# Patient Record
Sex: FEMALE | Race: WHITE | NOT HISPANIC OR LATINO | Employment: OTHER | URBAN - METROPOLITAN AREA
[De-identification: names, ages, dates, MRNs, and addresses within clinical notes are randomized per-mention and may not be internally consistent; named-entity substitution may affect disease eponyms.]

---

## 2017-01-16 ENCOUNTER — HOSPITAL ENCOUNTER (OUTPATIENT)
Dept: RADIOLOGY | Facility: HOSPITAL | Age: 57
Discharge: HOME | End: 2017-01-16
Attending: OTOLARYNGOLOGY
Payer: MEDICARE

## 2017-01-16 ENCOUNTER — CLINICAL SUPPORT (OUTPATIENT)
Dept: AUDIOLOGY | Facility: CLINIC | Age: 57
End: 2017-01-16
Payer: MEDICARE

## 2017-01-16 ENCOUNTER — OFFICE VISIT (OUTPATIENT)
Dept: OTOLARYNGOLOGY | Facility: CLINIC | Age: 57
End: 2017-01-16
Payer: MEDICARE

## 2017-01-16 VITALS
SYSTOLIC BLOOD PRESSURE: 127 MMHG | BODY MASS INDEX: 39.65 KG/M2 | DIASTOLIC BLOOD PRESSURE: 89 MMHG | WEIGHT: 203 LBS | TEMPERATURE: 98 F | HEART RATE: 81 BPM

## 2017-01-16 DIAGNOSIS — J32.9 CHRONIC SINUSITIS, UNSPECIFIED LOCATION: ICD-10-CM

## 2017-01-16 DIAGNOSIS — H90.3 SENSORY HEARING LOSS, BILATERAL: Primary | ICD-10-CM

## 2017-01-16 DIAGNOSIS — J30.89 PERENNIAL ALLERGIC RHINITIS, UNSPECIFIED ALLERGIC RHINITIS TRIGGER: ICD-10-CM

## 2017-01-16 DIAGNOSIS — J30.89 PERENNIAL ALLERGIC RHINITIS, UNSPECIFIED ALLERGIC RHINITIS TRIGGER: Primary | ICD-10-CM

## 2017-01-16 PROCEDURE — 70220 X-RAY EXAM OF SINUSES: CPT | Mod: 26,,, | Performed by: RADIOLOGY

## 2017-01-16 PROCEDURE — 70220 X-RAY EXAM OF SINUSES: CPT | Mod: TC,PO

## 2017-01-16 PROCEDURE — 31231 NASAL ENDOSCOPY DX: CPT | Mod: S$PBB,,, | Performed by: OTOLARYNGOLOGY

## 2017-01-16 PROCEDURE — 99203 OFFICE O/P NEW LOW 30 MIN: CPT | Mod: 25,S$PBB,, | Performed by: OTOLARYNGOLOGY

## 2017-01-16 PROCEDURE — 99999 PR PBB SHADOW E&M-EST. PATIENT-LVL III: CPT | Mod: PBBFAC,,, | Performed by: OTOLARYNGOLOGY

## 2017-01-16 NOTE — PROGRESS NOTES
Referring Provider:    Self Referral  No address on file  Subjective:   Patient: Shannen Manning [id number], :1960   Visit date:2017 2:29 PM    Chief Complaint:  Ear Fullness (review audio today ) and Sinus Problem (currently on zithromax 250 )    HPI:  Shannen is a 56 y.o. female who I was asked to see in consultation for evaluation of the following issue(s):     Chronic nasal drainage.  Numerous rounds of abx.  Uses singulair and nasal steroid sprays with good compliance.  Left ear pain and pressure.  Hx of TMJ surgery.  No prior allergy testing or imaging of the sinuses.  She has a history of nasal surgery.    Review of Systems:  Negative unless checked off.  Gen:  []fever   []fatigue  HENT:  []nosebleeds  []dental problem   Eyes:  []photophobia  []visual disturbance  Resp:  []chest tightness []wheezing  Card:  []chest pain  []leg swelling  GI:  []abdominal pain []blood in stool  :  []dysuria  []hematuria  Musc:  []joint swelling  []gait problem  Skin:  []color change  []pallor  Neuro:  []seizures  []numbness  Hem:  []bruise/bleed easily  Psych:  []hallucinations  []behavioral problems  Allergy/Imm: is allergic to demerol [meperidine].    Her meds, allergies, medical, surgical, social & family histories were reviewed & updated:  -     She has a current medication list which includes the following prescription(s): acyclovir, albuterol, albuterol, amlodipine, aspirin, calcium citrate-vitamin d3 315-200 mg, cetirizine, cholecalciferol (vitamin d3), clopidogrel, cyanocobalamin, docusate sodium, estradiol, estradiol, fluticasone, fluticasone-salmeterol 250-50 mcg/dose, gabapentin, ibuprofen, metformin, metoprolol succinate, montelukast, multivitamin, omeprazole, pravastatin, tamsulosin, tiotropium, tizanidine, and zinc sulfate.  -     She  has a past medical history of COPD (chronic obstructive pulmonary disease); Hypertension; and IVON on CPAP.   -     She  does not have any pertinent problems on file.    -     She  has a past surgical history that includes Hysterectomy; tonsilectomy; Nose surgery; and Temporomandibular joint surgery.  -     She  reports that she has quit smoking. Her smoking use included Cigarettes. She smoked 2.00 packs per day. She does not have any smokeless tobacco history on file. She reports that she does not drink alcohol or use illicit drugs.  -     Her family history includes Asthma in her father; Cancer in her maternal grandfather and maternal grandmother; Diabetes in her mother; Emphysema in her father; Hypertension in her mother.  -     She is allergic to demerol [meperidine].    Objective:     Physical Exam:  Vitals:    Visit Vitals    /89    Pulse 81    Temp 98.1 °F (36.7 °C) (Tympanic)    Wt 92.1 kg (203 lb)    BMI 39.65 kg/m2     General appearance:  Well developed, well nourished    Eyes:  Extraocular motions intact, PERRL    Communication:  no hoarseness, no dysphonia    Ears:  Otoscopy of external auditory canals and tympanic membranes was normal, clinical speech reception thresholds grossly intact, no mass/lesion of auricle.  Nose:  Moderate erythema of the nasal cavities. No significant drainage.  Mouth:  No mass/lesion of lips, teeth, gums, hard/soft palate, tongue, tonsils, or oropharynx.  Pain to palpation of left TMJ.      Cardiovascular:  No pedal edema; Radial Pulses +2     Neck & Lymphatics:  No cervical lymphadenopathy, no neck mass/crepitus/ asymmetry, trachea is midline, no thyroid enlargement/tenderness/mass.    Psych: Oriented x3,  Alert with normal mood and affect.     Respiration/Chest:  Symmetric expansion during respiration, normal respiratory effort.    Skin:  Warm and intact. No ulcerations of face, scalp, neck.      Assessment & Plan:   Shannen was seen today for ear fullness and sinus problem.    Diagnoses and all orders for this visit:    Perennial allergic rhinitis, unspecified allergic rhinitis trigger  -     Aspergillus fumagatus IgE;  Future  -     [place]erm[place] grass IgE; Future  -     Cat epithelium IgE; Future  -     Cladosporium IgE; Future  -     Cockroach, American IgE; Future  -     [place], bald IgE; Future  -     D. farinae IgE; Future  -     D. pteronyssinus IgE; Future  -     Dog dander IgE; Future  -     Plantain, English IgE; Future  -     Jay grass IgE; Future  -     Marsh elder, rough IgE; Future  -     Mugwort IgE; Future  -     Nettle IgE; Future  -     Oak, white IgE; Future  -     Penicillium IgE; Future  -     Ragweed, short, common IgE; Future  -     Ezequiel IgE; Future  -     Allergen, Cocklebur; Future  -     Allergen, Elm Cedar; Future  -     Allergen, Meadow Grass ([place]); Future  -     Allergen, Mucor Racemosus; Future  -     Allergen, Pecan [place] IgE; Future  -     Allergen, White [place]; Future  -     Allergen-Alternaria Alternata; Future  -     Allergen-Cedar; Future  -     Allergen-Common Pigweed; Future  -     Allergen-Silver Birch; Future  -     Feather Panel #2; Future  -     RAST Allergen for [place]; Future  -     RAST Allergen Maple ([place]); Future  -     RAST Allergen [place]; Future  -     RAST Allergen, [place]arters; Future  -     RAST Allergen, Sheep [place]); Future  -     X-Ray Sinuses Min 3 Views; Future    Chronic sinusitis, unspecified location  -     Aspergillus fumagatus IgE; Future  -     [place] grass IgE; Future  -     Cat epithelium IgE; Future  -     Cladosporium IgE; Future  -     Cockroach, American IgE; Future  -     [place], bald IgE; Future  -     D. farinae IgE; Future  -     D. pteronyssinus IgE; Future  -     Dog dander IgE; Future  -     Plantain, English IgE; Future  -     Jay grass IgE; Future  -     Marsh elder, rough IgE; Future  -     Mugwort IgE; Future  -     Nettle IgE; Future  -     Oak, white IgE; Future  -     Penicillium IgE; Future  -     Ragweed, short, common IgE; Future  -     Ezequiel IgE; Future  -     Allergen, Cocklebur; Future  -      Allergen, Elm Cedar; Future  -     Allergen, Meadow Grass ([place]); Future  -     Allergen, Mucor Racemosus; Future  -     Allergen, Pecan [place] IgE; Future  -     Allergen, White [place]; Future  -     Allergen-Alternaria Alternata; Future  -     Allergen-Cedar; Future  -     Allergen-Common Pigweed; Future  -     Allergen-Silver Birch; Future  -     Feather Panel #2; Future  -     RAST Allergen for Eastern [place]; Future  -     RAST Allergen Maple ([place]); Future  -     RAST Allergen [place]; Future  -     RAST Allergen, Lamb's Quarters; Future  -     RAST Allergen, Sheep [place] Dock); Future  -     X-Ray Sinuses Min 3 Views; Future      -SINUSITIS/RHINITIS  Shannen presents today for initial evaluation.  They have multiple sinonasal complaints and determination of the underlying etiology is a problem of moderate to high complexity.  Patients may present with sinonasal symptoms such as nasal obstruction as a primary anatomic disorder.  Patients may also present with recurrent or chronic inflammatory sinonasal symptoms.  Generally, patients can be stratified into one of several groups.      The first group represents patients who have frequent or recurrent upper respiratory infections, most frequently viral.  These patients most frequently have normally functioning immune system's but have high levels of exposure.  This is commonly seen in nurses and schoolteachers as well as other groups who spend large amounts of time around 6 patient's and children.      Other patients may have isolated rhinitis without evidence of sinusitis.  The majority of these patients have ALLERGIC rhinitis however other groups may have more rare forms of rhinitis such as nonallergic rhinitis with eosinophilia syndrome.  As a screening evaluation, if the patient has had a normal endoscopy, from time to time a simple x-ray of the sinuses may be performed in combination with antigen specific ALLERGY testing.    The  third group of patients present with significant evidence of chronic sinusitis.  Nasal endoscopy may reveal polyps or purulent drainage.  CT scan is an important aspect to determining the extent of disease.  Some patients with chronic sinusitis have an underlying etiology of ALLERGY leading to obstruction of the ostiomeatal units with furthering of the infection.  Others may have an acute infection that leads to stenosis of the sinonasal openings followed by a long, progressive course of infection.  Patients with unilateral disease who do not have inverting papilloma typically fall into this latter group.    CT scan of the sinuses has not been performed.      Antigen specific allergy testing  has not been performed.    Allergy treatment with topical steroids and/or antihistamines has been used with good compliance with symptoms unchanged.      By review of all data, history and exam, there does not seem to be a clear distinction between allergic rhinitis versus rhinitis with a component of chronic sinusitis.        My recommendation is antigen specific allergy testing, imaging of sinuses.      EAR pain  -     Temporomandibular joint disorder (TMJ) - Shannen has temporomandibular joint disorder (TMJ).  We recommend and discussed with her conservative measures such as taking anti-inflammatory pain medications (Motrin, Advil, Tylenol), eating a diet of soft foods, applying warm compresses to the area, or gentle muscle stretching and relaxation exercises may help. It is important to avoid chewing gum or eating hard foods. Most cases of TMJ are temporary (usually <2 weeks); thus, treatment is usually conservative.  However, for persisting chronic TMJ, we recommend seeing an oral specialist who may fit you with /splint.      We discussed her medical conditions, treatments and plan.  Shannen should return to clinic if any issues arise (symptoms worsen or persist), otherwise we will see her back in the clinic  only as needed.      Thank you for allowing me to participate in the care of Shannen.    Alex Torres MD        Patient: Shannen Manning [id number], :1960  Procedure date:2017  Patient's medications, allergies, past medical, surgical, social and family histories were reviewed and updated as appropriate.  Chief Complaint:  Ear Fullness (review audio today ) and Sinus Problem (currently on zithromax 250 )    HPI:  Shannen is a 56 y.o. female with the history of present illness as discussed in the clinic note from today.    Procedure: Risks, benefits, and alternatives of the procedure were discussed with the patient, and the patient consented to the nasal endoscopy.  The nasal cavity was sprayed with a topical decongestant and anesthetic (if needed). The endoscope was passed into each nostril and each nasal cavity was visualized.  On each side the nasal cavity, sinuses (if open), turbinates, and septum were examined with the findings described below. At the end of the examination, the scope was removed. The patient tolerated the procedure well with no complications.       Endoscopic Sinonasal Exam Findings:  -     The right side has moderate edema  -     The left side has moderate edema  -     Nasal secretions: No discolored secretions noted bilaterally  -     Nasal septum: no significant deviation or perforation appreciated   -     Inferior turbinate: Normal mucosa without significant hypertrophy bilaterally  -     Middle turbinate: Normal mucosa without significant hypertrophy bilaterally  -     Other findings: No mass or obstructive lesion    Assessment & Plan:  - see today's clinic note

## 2017-01-16 NOTE — MR AVS SNAPSHOT
[facility] - ENT  [address] [facility] [place] [address]  Phone: [phone]  Fax: [phone]                  Shannen Manning   2017 1:30 PM   Office Visit    Description:  Female : 1960   Provider:  Alex Torres MD   Department:  [facility] - ENT           Reason for Visit     Ear Fullness     Sinus Problem           Diagnoses this Visit        Comments    Perennial allergic rhinitis, unspecified allergic rhinitis trigger    -  Primary     Chronic sinusitis, unspecified location                To Do List           Future Appointments        Provider Department Dept Phone    2017 2:15 PM [facility] XR2 [facility] [phone]    2017 4:00 PM LAB, SAME DAY [facility] [phone]    2017 1:00 PM PULMONARY LAB, [facility] [phone]    2017 1:20 PM Elizabeth Lejeune, NP [facility] - Pulmonary Services [phone]      Goals (5 Years of Data)     None      [facility]     [facility] Care [facility] -  Assistance  Registered nurses in the [facility] provide clinical advisement, health education, appointment booking, and other advisory services.  Call for this free service at [phone].             Medications           Message regarding Medications     Verify the changes and/or additions to your medication regime listed below are the same as discussed with your clinician today.  If any of these changes or additions are incorrect, please notify your healthcare provider.        STOP taking these medications     benzonatate (TESSALON) 100 MG capsule Take 2 capsules (200 mg total) by mouth 3 (three) times daily as needed for Cough.    estrogens, conjugated, (PREMARIN) 0.3 MG tablet Take 0.3 mg by mouth once daily.    fluconazole (DIFLUCAN) 150 MG Tab TAKE 1 TABLET BY MOUTH ONCE FOR 1 DOSE.    methylPREDNISolone (MEDROL DOSEPACK) 4 mg tablet use as directed    oxybutynin (DITROPAN) 5 MG Tab Take 5 mg  by mouth 2 (two) times daily.           Verify that the below list of medications is an accurate representation of the medications you are currently taking.  If none reported, the list may be blank. If incorrect, please contact your healthcare provider. Carry this list with you in case of emergency.           Current Medications     acyclovir (ZOVIRAX) 400 MG tablet Take 400 mg by mouth 2 (two) times daily.    albuterol (PROVENTIL HFA) 90 mcg/actuation HFAA Inhale 2 puffs into the lungs every 6 (six) hours as needed.    albuterol (PROVENTIL) 2.5 mg /3 mL (0.083 %) nebulizer solution Take 3 mLs (2.5 mg total) by nebulization every 6 (six) hours as needed for Shortness of Breath.    amlodipine (NORVASC) 10 MG tablet Take 10 mg by mouth once daily.    aspirin (ECOTRIN) 81 MG EC tablet Take 81 mg by mouth once daily.    calcium citrate-vitamin D3 315-200 mg (CITRACAL+D) 315-200 mg-unit per tablet Take 1 tablet by mouth 2 (two) times daily.    cetirizine (ZYRTEC) 10 MG tablet Take 10 mg by mouth.    cholecalciferol, vitamin D3, (VITAMIN D3) 2,000 unit Cap Take 1 capsule by mouth once daily.    clopidogrel (PLAVIX) 75 mg tablet Take 75 mg by mouth.    cyanocobalamin (VITAMIN B-12) 1000 MCG tablet Take 2,500 mcg by mouth.     docusate sodium (COLACE) 100 MG capsule Take 100 mg by mouth 2 (two) times daily.    estradiol (ESTRACE) 1 MG tablet Take 1 mg by mouth.    estradiol (VAGIFEM) 10 mcg Tab Place 10 mcg vaginally.    fluticasone (FLONASE) 50 mcg/actuation nasal spray 1 puff each nostril once a day    fluticasone-salmeterol 250-50 mcg/dose (ADVAIR DISKUS) 250-50 mcg/dose diskus inhaler Inhale 1 puff into the lungs once daily.    gabapentin (NEURONTIN) 600 MG tablet Take 600 mg by mouth 3 (three) times daily.    ibuprofen (ADVIL,MOTRIN) 800 MG tablet Take 800 mg by mouth 3 (three) times daily.    metformin (GLUCOPHAGE-XR) 500 MG 24 hr tablet     metoprolol succinate (TOPROL-XL) 100 MG 24 hr tablet Take 100 mg by mouth  once daily.    montelukast (SINGULAIR) 10 mg tablet Take 1 tablet (10 mg total) by mouth every evening.    multivitamin (ONE DAILY MULTIVITAMIN) per tablet Take 1 tablet by mouth.    omeprazole (PRILOSEC) 40 MG capsule Take 40 mg by mouth once daily.    pravastatin (PRAVACHOL) 20 MG tablet Take 20 mg by mouth once daily.    tamsulosin (FLOMAX) 0.4 mg Cp24 Take 0.4 mg by mouth.    tiotropium (SPIRIVA) 18 mcg inhalation capsule Inhale 1 capsule (18 mcg total) into the lungs once daily.    tizanidine (ZANAFLEX) 4 MG tablet     zinc sulfate (ZINCATE) 220 (50) mg capsule Take 220 mg by mouth once daily.           Clinical Reference Information           Vital Signs - Last Recorded  Most recent update: 1/16/2017  1:31 PM by Leigh Greene MA    BP Pulse Temp Wt BMI    127/89 81 98.1 °F (36.7 °C) (Tympanic) 92.1 kg (203 lb) 39.65 kg/m2      Blood Pressure          Most Recent Value    BP  127/89      Allergies as of 1/16/2017     Demerol [Meperidine]      Immunizations Administered on Date of Encounter - 1/16/2017     None      Orders Placed During Today's Visit     Future Labs/Procedures Expected by Expires    Allergen, Cocklebur  1/16/2017 3/17/2018    Allergen, Elm [place]  1/16/2017 3/17/2018    Allergen, Meadow Grass ([email or web address] [employer] Blue)  1/16/2017 3/17/2018    Allergen, Mucor Racemosus  1/16/2017 3/17/2018    Allergen, Pecan [place] IgE  1/16/2017 3/17/2018    Allergen, White [place]  1/16/2017 3/17/2018    Allergen-Alternaria Alternata  1/16/2017 3/17/2018    Allergen-[place]  1/16/2017 3/17/2018    Allergen-Common Pigweed  1/16/2017 3/17/2018    Allergen-Silver Birch  1/16/2017 3/17/2018    Aspergillus fumagatus IgE  1/16/2017 3/17/2018    Bermuda grass IgE  1/16/2017 3/17/2018    Cat epithelium IgE  1/16/2017 3/17/2018    Cladosporium IgE  1/16/2017 3/17/2018    Cockroach, American IgE  1/16/2017 3/17/2018    [place], bald IgE  1/16/2017 3/17/2018    D. farinae IgE  1/16/2017 3/17/2018    D. pteronyssinus IgE  1/16/2017  3/17/2018    Dog dander IgE  1/16/2017 3/17/2018    Feather Panel #2  1/16/2017 3/17/2018    Jay grass IgE  1/16/2017 3/17/2018    [place] rough IgE  1/16/2017 3/17/2018    Mugwort IgE  1/16/2017 3/17/2018    Nettle IgE  1/16/2017 3/17/2018    [place], white IgE  1/16/2017 3/17/2018    Penicillium IgE  1/16/2017 3/17/2018    Plantain, English IgE  1/16/2017 3/17/2018    Ragweed, short, common IgE  1/16/2017 3/17/2018    RAST Allergen for Eastern [place]  1/16/2017 3/17/2018    RAST Allergen Maple ([place])  1/16/2017 3/17/2018    RAST Allergen [place]  1/16/2017 3/17/2018    RAST Allergen, [place]  1/16/2017 3/17/2018    RAST Allergen, Sheep [place])  1/16/2017 3/17/2018    Ezequiel IgE  1/16/2017 3/17/2018    X-Ray Sinuses Min 3 Views  1/16/2017 1/16/2018      [facility] Sign-Up     Activating your [facility] account is as easy as 1-2-3!     1) Visit [email or web address], select Sign Up Now, enter this activation code and your date of birth, then select Next.  [email or web address]  Expires: 3/2/2017  2:04 PM      2) Create a username and password to use when you visit [facility] in the future and select a security question in case you lose your password and select Next.    3) Enter your e-mail address and click Sign Up!    Additional Information  If you have questions, please e-mail [email or web address] or call [phone] to talk to our [facility] staff. Remember, [facility] is NOT to be used for urgent needs. For medical emergencies, dial 911.

## 2017-01-16 NOTE — PROGRESS NOTES
Shannen Manning was seen 01/16/2017 for an audiological evaluation.  Patient complains of nasal congestion and a sensation that her left ear is stopped up.  She denies hearing loss, tinnitus, and dizziness. She is currently taking a second dose of Levaquin.    Results reveal a mild sensorineural hearing loss 7466-1078 Hz for the right ear, and  mild sensorineural hearing loss 6529-8210 Hz for the left ear.   Speech Reception Thresholds were  15 dBHL for the right ear and 15 dBHL for the left ear.   Word recognition scores were excellent for the right ear and excellent for the left ear.   Tympanograms were Type A for the right ear and Type A for the left ear.    Patient was counseled on the above findings.    REC:  ENT review of audiogram

## 2017-01-17 ENCOUNTER — TELEPHONE (OUTPATIENT)
Dept: OTOLARYNGOLOGY | Facility: CLINIC | Age: 57
End: 2017-01-17

## 2017-01-17 NOTE — TELEPHONE ENCOUNTER
Left message advising on patient mobile voicemail advising of normal xray results. Asked to please return our call with any additional questions or concerns.

## 2017-01-17 NOTE — TELEPHONE ENCOUNTER
----- Message from Alex Torres MD sent at 1/16/2017 10:50 PM CST -----  Please let her know that her sinus xrays are normal.  I will contact her once allergy tests are back.

## 2017-01-19 ENCOUNTER — TELEPHONE (OUTPATIENT)
Dept: OTOLARYNGOLOGY | Facility: CLINIC | Age: 57
End: 2017-01-19

## 2017-01-19 NOTE — TELEPHONE ENCOUNTER
----- Message from Alex Torres MD sent at 1/18/2017  5:10 PM CST -----  Please let her know that all of her ALLERGY test returned as normal with no evidence of significant ALLERGY.

## 2017-01-23 ENCOUNTER — OFFICE VISIT (OUTPATIENT)
Dept: PULMONOLOGY | Facility: CLINIC | Age: 57
End: 2017-01-23
Payer: MEDICARE

## 2017-01-23 ENCOUNTER — PROCEDURE VISIT (OUTPATIENT)
Dept: PULMONOLOGY | Facility: CLINIC | Age: 57
End: 2017-01-23
Payer: MEDICARE

## 2017-01-23 VITALS
DIASTOLIC BLOOD PRESSURE: 64 MMHG | HEIGHT: 60 IN | OXYGEN SATURATION: 97 % | RESPIRATION RATE: 18 BRPM | WEIGHT: 204 LBS | SYSTOLIC BLOOD PRESSURE: 104 MMHG | HEART RATE: 78 BPM | BODY MASS INDEX: 40.05 KG/M2

## 2017-01-23 DIAGNOSIS — J44.9 CHRONIC OBSTRUCTIVE PULMONARY DISEASE, UNSPECIFIED COPD TYPE: ICD-10-CM

## 2017-01-23 DIAGNOSIS — G47.33 OSA (OBSTRUCTIVE SLEEP APNEA): ICD-10-CM

## 2017-01-23 DIAGNOSIS — J44.9 CHRONIC OBSTRUCTIVE PULMONARY DISEASE, UNSPECIFIED COPD TYPE: Primary | ICD-10-CM

## 2017-01-23 PROCEDURE — 99999 PR PBB SHADOW E&M-EST. PATIENT-LVL III: CPT | Mod: PBBFAC,,, | Performed by: NURSE PRACTITIONER

## 2017-01-23 PROCEDURE — 99214 OFFICE O/P EST MOD 30 MIN: CPT | Mod: 25,S$PBB,, | Performed by: NURSE PRACTITIONER

## 2017-01-23 PROCEDURE — 94060 EVALUATION OF WHEEZING: CPT | Mod: 26,S$PBB,, | Performed by: INTERNAL MEDICINE

## 2017-01-23 PROCEDURE — 99213 OFFICE O/P EST LOW 20 MIN: CPT | Mod: PBBFAC,PO,25 | Performed by: NURSE PRACTITIONER

## 2017-01-23 RX ORDER — PREDNISONE 10 MG/1
40 TABLET ORAL
COMMUNITY
Start: 2017-01-19 | End: 2017-01-24

## 2017-01-23 RX ORDER — ROSUVASTATIN CALCIUM 10 MG/1
10 TABLET, COATED ORAL
COMMUNITY
Start: 2017-01-19 | End: 2018-02-21

## 2017-01-23 RX ORDER — ALBUTEROL SULFATE 90 UG/1
2 AEROSOL, METERED RESPIRATORY (INHALATION) EVERY 4 HOURS PRN
Qty: 3 INHALER | Refills: 3 | Status: SHIPPED | OUTPATIENT
Start: 2017-01-23 | End: 2018-02-21

## 2017-01-23 RX ORDER — LEVOFLOXACIN 750 MG/1
TABLET ORAL
COMMUNITY
Start: 2017-01-12 | End: 2017-01-23 | Stop reason: ALTCHOICE

## 2017-01-23 RX ORDER — NYSTATIN 100000 [USP'U]/G
POWDER TOPICAL
COMMUNITY
Start: 2016-07-19 | End: 2018-02-21

## 2017-01-23 NOTE — MR AVS SNAPSHOT
[facility]  [address] [place] [address]  Phone: [phone]  Fax: [phone]                  Shannen Manning   2017 1:20 PM   Office Visit    Description:  Female : 1960   Provider:  Elizabeth Lejeune, NP   Department:  [facility] - Pulmonary Services           Reason for Visit     Sleep Apnea           Diagnoses this Visit        Comments    Chronic obstructive pulmonary disease, unspecified COPD type    -  Primary     IVON (obstructive sleep apnea)                To Do List           Future Appointments        Provider Department Dept Phone    2018 12:45 PM SUMH XR2 [facility] [phone]    2018 1:00 PM PULMONARY LAB, [facility] Pulmonary Function cs [phone]    2018 1:40 PM Derrick Marlow MD [facility] [phone]      Goals (5 Years of Data)     None       These Medications        Disp Refills Start End    albuterol (PROAIR HFA) 90 mcg/actuation inhaler 3 Inhaler 3 2017     Inhale 2 puffs into the lungs every 4 (four) hours as needed for Wheezing. - Inhalation    Pharmacy: [facility] [address] #: [phone]         [facility]     [facility] Line -  Assistance  Registered nurses in the [facility] Center provide clinical advisement, health education, appointment booking, and other advisory services.  Call for this free service at [phone].             Medications           Message regarding Medications     Verify the changes and/or additions to your medication regime listed below are the same as discussed with your clinician today.  If any of these changes or additions are incorrect, please notify your healthcare provider.        START taking these NEW medications        Refills    albuterol (PROAIR HFA) 90 mcg/actuation inhaler 3    Sig: Inhale 2 puffs into the lungs every 4 (four) hours as needed for Wheezing.     Class: Normal    Route: Inhalation      STOP taking these medications     levoFLOXacin (LEVAQUIN) 750 MG tablet            Verify that the below list of medications is an accurate representation of the medications you are currently taking.  If none reported, the list may be blank. If incorrect, please contact your healthcare provider. Carry this list with you in case of emergency.           Current Medications     acyclovir (ZOVIRAX) 400 MG tablet Take 400 mg by mouth 2 (two) times daily.    albuterol (PROAIR HFA) 90 mcg/actuation inhaler Inhale 2 puffs into the lungs every 4 (four) hours as needed for Wheezing.    albuterol (PROVENTIL) 2.5 mg /3 mL (0.083 %) nebulizer solution Take 3 mLs (2.5 mg total) by nebulization every 6 (six) hours as needed for Shortness of Breath.    amlodipine (NORVASC) 10 MG tablet Take 10 mg by mouth once daily.    aspirin (ECOTRIN) 81 MG EC tablet Take 81 mg by mouth once daily.    calcium citrate-vitamin D3 315-200 mg (CITRACAL+D) 315-200 mg-unit per tablet Take 1 tablet by mouth 2 (two) times daily.    cetirizine (ZYRTEC) 10 MG tablet Take 10 mg by mouth.    cholecalciferol, vitamin D3, (VITAMIN D3) 2,000 unit Cap Take 1 capsule by mouth once daily.    clopidogrel (PLAVIX) 75 mg tablet Take 75 mg by mouth.    cyanocobalamin (VITAMIN B-12) 1000 MCG tablet Take 2,500 mcg by mouth.     docusate sodium (COLACE) 100 MG capsule Take 100 mg by mouth 2 (two) times daily.    estradiol (ESTRACE) 1 MG tablet Take 1 mg by mouth.    estradiol (VAGIFEM) 10 mcg Tab Place 10 mcg vaginally.    fluticasone (FLONASE) 50 mcg/actuation nasal spray 1 puff each nostril once a day    fluticasone-salmeterol 250-50 mcg/dose (ADVAIR DISKUS) 250-50 mcg/dose diskus inhaler Inhale 1 puff into the lungs once daily.    gabapentin (NEURONTIN) 600 MG tablet Take 600 mg by mouth 3 (three) times daily.    ibuprofen (ADVIL,MOTRIN) 800 MG tablet Take 800 mg by mouth 3 (three) times daily.    metformin (GLUCOPHAGE-XR) 500  MG 24 hr tablet     metoprolol succinate (TOPROL-XL) 100 MG 24 hr tablet Take 100 mg by mouth once daily.    montelukast (SINGULAIR) 10 mg tablet Take 1 tablet (10 mg total) by mouth every evening.    multivitamin (ONE DAILY MULTIVITAMIN) per tablet Take 1 tablet by mouth.    nystatin (MYCOSTATIN) powder Apply topically.    omeprazole (PRILOSEC) 40 MG capsule Take 40 mg by mouth once daily.    pravastatin (PRAVACHOL) 20 MG tablet Take 20 mg by mouth once daily.    predniSONE (DELTASONE) 10 MG tablet Take 40 mg by mouth.    rosuvastatin (CRESTOR) 10 MG tablet Take 10 mg by mouth.    tamsulosin (FLOMAX) 0.4 mg Cp24 Take 0.4 mg by mouth.    tiotropium (SPIRIVA) 18 mcg inhalation capsule Inhale 1 capsule (18 mcg total) into the lungs once daily.    tizanidine (ZANAFLEX) 4 MG tablet     zinc sulfate (ZINCATE) 220 (50) mg capsule Take 220 mg by mouth once daily.           Clinical Reference Information           Vital Signs - Last Recorded  Most recent update: 1/23/2017  1:59 PM by Vonda Kim LPN    BP Pulse Resp Ht Wt SpO2    104/64 78 18 5' (1.524 m) 92.5 kg (204 lb) 97%    BMI                39.84 kg/m2          Blood Pressure          Most Recent Value    BP  104/64      Allergies as of 1/23/2017     Demerol [Meperidine]      Immunizations Administered on Date of Encounter - 1/23/2017     None      Orders Placed During Today's Visit     Future Labs/Procedures Expected by Expires    X-Ray Chest PA And Lateral  1/23/2017 1/23/2018    Complete PFT with bronchodilator  As directed 1/23/2018      [facility] Sign-Up     Activating your [facility] account is as easy as 1-2-3!     1) Visit [email or web address], select Sign Up Now, enter this activation code and your date of birth, then select Next.  [email or web address]  Expires: 3/2/2017  2:04 PM      2) Create a username and password to use when you visit [facility] in the future and select a security question in case you lose your password and select Next.    3) Enter your  e-mail address and click Sign Up!    Additional Information  If you have questions, please e-mail [email or web address] or call [phone] to talk to our [facility] staff. Remember, [facility] is NOT to be used for urgent needs. For medical emergencies, dial 911.

## 2017-01-23 NOTE — PROGRESS NOTES
Subjective:      Patient ID: Shannen Manning is a 56 y.o. female.    Chief Complaint: Sleep Apnea    HPI Comments: Presents to office for COPD.  She is seeing Dr. Torres for sinus disease.  Recent diagnosis of pneumonia with her PCP.  Treated with Levaquin.  Chest x-ray on 11/16 without infiltrate reviewed in care everywhere.  She is a chronic cough.  She is compliant with Advair and Spiriva.  Albuterol as needed.  She quit smoking October 2015.  She [place] [facility]  Unable to wear due to her recent sinus infection and pneumonia.      Visit Vitals    /64    Pulse 78    Resp 18    Ht 5' (1.524 m)    Wt 92.5 kg (204 lb)    SpO2 97%    BMI 39.84 kg/m2     Body mass index is 39.84 kg/(m^2).    Review of Systems   Constitutional: Negative.    HENT: Positive for postnasal drip and rhinorrhea.    Respiratory:        See HPI   Cardiovascular: Negative.    Musculoskeletal: Negative.    Gastrointestinal: Negative.    Neurological: Negative.    Psychiatric/Behavioral: Negative.      Objective:      Physical Exam   Constitutional: She is oriented to person, place, and time. She appears well-developed and well-nourished.   HENT:   Head: Normocephalic and atraumatic.   Mouth/Throat: Oropharynx is clear and moist.   Neck: Normal range of motion. Neck supple.   Cardiovascular: Normal rate and regular rhythm.  Exam reveals no gallop.    No murmur heard.  Pulmonary/Chest: Effort normal and breath sounds normal.   Abdominal: Soft. Bowel sounds are normal. She exhibits no mass. There is no tenderness.   Musculoskeletal: Normal range of motion. She exhibits no edema.   Neurological: She is alert and oriented to person, place, and time.   Skin: Skin is warm and dry.   Psychiatric: She has a normal mood and affect.     Personal Diagnostic Review  Spirometry with an FEV1 of 81% of predicted.  Normal ratio        Assessment:       1. Chronic obstructive pulmonary disease, unspecified COPD type    2. IOVN (obstructive sleep  apnea)        Outpatient Encounter Prescriptions as of 1/23/2017   Medication Sig Dispense Refill    acyclovir (ZOVIRAX) 400 MG tablet Take 400 mg by mouth 2 (two) times daily.      albuterol (PROVENTIL HFA) 90 mcg/actuation HFAA Inhale 2 puffs into the lungs every 6 (six) hours as needed. 3 Inhaler 4    albuterol (PROVENTIL) 2.5 mg /3 mL (0.083 %) nebulizer solution Take 3 mLs (2.5 mg total) by nebulization every 6 (six) hours as needed for Shortness of Breath. 360 mL 11    amlodipine (NORVASC) 10 MG tablet Take 10 mg by mouth once daily.      aspirin (ECOTRIN) 81 MG EC tablet Take 81 mg by mouth once daily.      calcium citrate-vitamin D3 315-200 mg (CITRACAL+D) 315-200 mg-unit per tablet Take 1 tablet by mouth 2 (two) times daily.      cetirizine (ZYRTEC) 10 MG tablet Take 10 mg by mouth.      cholecalciferol, vitamin D3, (VITAMIN D3) 2,000 unit Cap Take 1 capsule by mouth once daily.      clopidogrel (PLAVIX) 75 mg tablet Take 75 mg by mouth.      cyanocobalamin (VITAMIN B-12) 1000 MCG tablet Take 2,500 mcg by mouth.       docusate sodium (COLACE) 100 MG capsule Take 100 mg by mouth 2 (two) times daily.      estradiol (ESTRACE) 1 MG tablet Take 1 mg by mouth.      estradiol (VAGIFEM) 10 mcg Tab Place 10 mcg vaginally.      fluticasone (FLONASE) 50 mcg/actuation nasal spray 1 puff each nostril once a day      fluticasone-salmeterol 250-50 mcg/dose (ADVAIR DISKUS) 250-50 mcg/dose diskus inhaler Inhale 1 puff into the lungs once daily. 90 each 3    gabapentin (NEURONTIN) 600 MG tablet Take 600 mg by mouth 3 (three) times daily.      ibuprofen (ADVIL,MOTRIN) 800 MG tablet Take 800 mg by mouth 3 (three) times daily.      metformin (GLUCOPHAGE-XR) 500 MG 24 hr tablet       metoprolol succinate (TOPROL-XL) 100 MG 24 hr tablet Take 100 mg by mouth once daily.      montelukast (SINGULAIR) 10 mg tablet Take 1 tablet (10 mg total) by mouth every evening. 90 tablet 11    multivitamin (ONE DAILY  MULTIVITAMIN) per tablet Take 1 tablet by mouth.      nystatin (MYCOSTATIN) powder Apply topically.      omeprazole (PRILOSEC) 40 MG capsule Take 40 mg by mouth once daily.      pravastatin (PRAVACHOL) 20 MG tablet Take 20 mg by mouth once daily.      predniSONE (DELTASONE) 10 MG tablet Take 40 mg by mouth.      rosuvastatin (CRESTOR) 10 MG tablet Take 10 mg by mouth.      tamsulosin (FLOMAX) 0.4 mg Cp24 Take 0.4 mg by mouth.      tiotropium (SPIRIVA) 18 mcg inhalation capsule Inhale 1 capsule (18 mcg total) into the lungs once daily. 90 capsule 3    tizanidine (ZANAFLEX) 4 MG tablet       zinc sulfate (ZINCATE) 220 (50) mg capsule Take 220 mg by mouth once daily.      [DISCONTINUED] levoFLOXacin (LEVAQUIN) 750 MG tablet        No facility-administered encounter medications on file as of 1/23/2017.      No orders of the defined types were placed in this encounter.    Plan:      Continue CPAP.  Continue current pulmonary regimen.  Pneumonia resolved. Reviewed cxr results in care everywhere.   Continue to follow with Dr Torres.

## 2017-01-24 LAB
POST FEF 25 75: 1.57 L/S (ref 1.7–2.79)
POST FET 100: 8.45 S
POST FEV1 FVC: 76 %
POST FEV1: 1.83 L (ref 2–2.51)
POST FIF 50: 1.43 L/S
POST FVC: 2.42 L (ref 2.59–3.19)
POST PEF: 3.34 L/S (ref 5.08–6.58)
PRE FEF 25 75: 1.51 L/S (ref 1.7–2.79)
PRE FET 100: 7.82 S
PRE FEV1 FVC: 76 %
PRE FEV1: 1.75 L (ref 2–2.51)
PRE FIF 50: 1.69 L/S
PRE FVC: 2.3 L (ref 2.59–3.19)
PRE PEF: 3.41 L/S (ref 5.08–6.58)
PREDICTED FEV1 FVC: 78.7 % (ref 73.8–83.59)
PREDICTED FEV1: 2.25 L (ref 2–2.51)
PREDICTED FVC: 2.89 L (ref 2.59–3.19)
PROVOCATION PROTOCOL: ABNORMAL

## 2017-12-05 DIAGNOSIS — J44.9 CHRONIC OBSTRUCTIVE PULMONARY DISEASE, UNSPECIFIED COPD TYPE: ICD-10-CM

## 2017-12-06 RX ORDER — TIOTROPIUM BROMIDE 18 UG/1
CAPSULE ORAL; RESPIRATORY (INHALATION)
Qty: 90 CAPSULE | Refills: 3 | Status: SHIPPED | OUTPATIENT
Start: 2017-12-06 | End: 2018-02-21 | Stop reason: CLARIF

## 2018-02-21 ENCOUNTER — HOSPITAL ENCOUNTER (OUTPATIENT)
Dept: RADIOLOGY | Facility: HOSPITAL | Age: 58
Discharge: HOME | End: 2018-02-21
Attending: NURSE PRACTITIONER
Payer: MEDICARE

## 2018-02-21 ENCOUNTER — OFFICE VISIT (OUTPATIENT)
Dept: PULMONOLOGY | Facility: CLINIC | Age: 58
End: 2018-02-21
Payer: MEDICARE

## 2018-02-21 ENCOUNTER — PROCEDURE VISIT (OUTPATIENT)
Dept: PULMONOLOGY | Facility: CLINIC | Age: 58
End: 2018-02-21
Payer: MEDICARE

## 2018-02-21 VITALS
HEART RATE: 91 BPM | HEIGHT: 60 IN | DIASTOLIC BLOOD PRESSURE: 82 MMHG | OXYGEN SATURATION: 97 % | BODY MASS INDEX: 42.21 KG/M2 | SYSTOLIC BLOOD PRESSURE: 126 MMHG | RESPIRATION RATE: 17 BRPM | WEIGHT: 215 LBS

## 2018-02-21 DIAGNOSIS — J44.9 CHRONIC OBSTRUCTIVE PULMONARY DISEASE, UNSPECIFIED COPD TYPE: ICD-10-CM

## 2018-02-21 DIAGNOSIS — G47.33 OSA ON CPAP: Primary | ICD-10-CM

## 2018-02-21 DIAGNOSIS — J31.0 RHINITIS, UNSPECIFIED CHRONICITY, UNSPECIFIED TYPE: ICD-10-CM

## 2018-02-21 PROBLEM — K21.9 GERD (GASTROESOPHAGEAL REFLUX DISEASE): Status: ACTIVE | Noted: 2018-02-21

## 2018-02-21 PROBLEM — Z87.891 FORMER SMOKER: Status: ACTIVE | Noted: 2017-06-22

## 2018-02-21 PROBLEM — M79.672 LEFT FOOT PAIN: Status: ACTIVE | Noted: 2017-12-09

## 2018-02-21 PROBLEM — Z86.010 HISTORY OF COLONIC POLYPS: Status: ACTIVE | Noted: 2018-02-21

## 2018-02-21 PROBLEM — I65.29 CAROTID STENOSIS, NON-SYMPTOMATIC: Status: ACTIVE | Noted: 2017-01-19

## 2018-02-21 PROBLEM — I34.1 MVP (MITRAL VALVE PROLAPSE): Status: ACTIVE | Noted: 2018-02-21

## 2018-02-21 PROBLEM — F41.0 PANIC ATTACKS: Status: ACTIVE | Noted: 2017-06-22

## 2018-02-21 PROBLEM — I10 HYPERTENSION: Status: ACTIVE | Noted: 2018-02-21

## 2018-02-21 PROBLEM — Z87.19 HISTORY OF ESOPHAGEAL SPASM: Status: ACTIVE | Noted: 2018-02-21

## 2018-02-21 PROBLEM — Z11.59 NEED FOR HEPATITIS C SCREENING TEST: Status: ACTIVE | Noted: 2017-06-22

## 2018-02-21 PROBLEM — M81.0 OSTEOPOROSIS, POST-MENOPAUSAL: Status: ACTIVE | Noted: 2018-02-21

## 2018-02-21 PROBLEM — E78.5 HYPERLIPIDEMIA: Status: ACTIVE | Noted: 2018-02-21

## 2018-02-21 PROBLEM — M51.36 DDD (DEGENERATIVE DISC DISEASE), LUMBAR: Status: ACTIVE | Noted: 2018-02-21

## 2018-02-21 PROCEDURE — 71046 X-RAY EXAM CHEST 2 VIEWS: CPT | Mod: 26,,, | Performed by: RADIOLOGY

## 2018-02-21 PROCEDURE — 99214 OFFICE O/P EST MOD 30 MIN: CPT | Mod: S$PBB,,, | Performed by: INTERNAL MEDICINE

## 2018-02-21 PROCEDURE — 71046 X-RAY EXAM CHEST 2 VIEWS: CPT | Mod: TC,FY,PO

## 2018-02-21 PROCEDURE — 99214 OFFICE O/P EST MOD 30 MIN: CPT | Mod: PBBFAC,PO | Performed by: INTERNAL MEDICINE

## 2018-02-21 PROCEDURE — 94726 PLETHYSMOGRAPHY LUNG VOLUMES: CPT | Mod: 26,S$PBB,, | Performed by: INTERNAL MEDICINE

## 2018-02-21 PROCEDURE — 94729 DIFFUSING CAPACITY: CPT | Mod: 26,S$PBB,, | Performed by: INTERNAL MEDICINE

## 2018-02-21 PROCEDURE — 94060 EVALUATION OF WHEEZING: CPT | Mod: 26,S$PBB,, | Performed by: INTERNAL MEDICINE

## 2018-02-21 PROCEDURE — 94726 PLETHYSMOGRAPHY LUNG VOLUMES: CPT | Mod: PBBFAC,PO

## 2018-02-21 PROCEDURE — 94060 EVALUATION OF WHEEZING: CPT | Mod: PBBFAC,PO

## 2018-02-21 PROCEDURE — 94729 DIFFUSING CAPACITY: CPT | Mod: PBBFAC,PO

## 2018-02-21 PROCEDURE — 99999 PR PBB SHADOW E&M-EST. PATIENT-LVL IV: CPT | Mod: PBBFAC,,, | Performed by: INTERNAL MEDICINE

## 2018-02-21 RX ORDER — ALBUTEROL SULFATE 90 UG/1
2 AEROSOL, METERED RESPIRATORY (INHALATION) EVERY 6 HOURS
Qty: 3 INHALER | Refills: 4 | Status: SHIPPED | OUTPATIENT
Start: 2018-02-21 | End: 2019-01-28 | Stop reason: SDUPTHER

## 2018-02-21 RX ORDER — DICYCLOMINE HYDROCHLORIDE 10 MG/1
10 CAPSULE ORAL
COMMUNITY

## 2018-02-21 RX ORDER — FLUTICASONE PROPIONATE 50 MCG
2 SPRAY, SUSPENSION (ML) NASAL DAILY
Qty: 3 BOTTLE | Refills: 4 | Status: SHIPPED | OUTPATIENT
Start: 2018-02-21 | End: 2019-01-28 | Stop reason: SDUPTHER

## 2018-02-21 RX ORDER — FLUTICASONE PROPIONATE AND SALMETEROL 250; 50 UG/1; UG/1
1 POWDER RESPIRATORY (INHALATION) DAILY
Qty: 90 EACH | Refills: 4 | Status: SHIPPED | OUTPATIENT
Start: 2018-02-21 | End: 2019-01-28 | Stop reason: SDUPTHER

## 2018-02-21 RX ORDER — MONTELUKAST SODIUM 10 MG/1
10 TABLET ORAL NIGHTLY
Qty: 90 TABLET | Refills: 4 | Status: SHIPPED | OUTPATIENT
Start: 2018-02-21 | End: 2019-01-28 | Stop reason: SDUPTHER

## 2018-02-21 NOTE — LETTER
February 21, 2018      Elizabeth Lejeune, NP  [address] [address]           [facility]  [address] [address]  Phone: [phone]  Fax: [phone]          Patient: Shannen Manning   MR Number: [id number]   YOB: 1960   Date of Visit: 2/21/2018       Dear Elizabeth Lejeune:    Thank you for referring Shannen Manning to me for evaluation. Attached you will find relevant portions of my assessment and plan of care.    If you have questions, please do not hesitate to call me. I look forward to following Shannen Manning along with you.    Sincerely,    Derrick Marlow MD    Enclosure  CC:  No Recipients    If you would like to receive this communication electronically, please contact [email or web address] or [phone] to request more information on [employer] Link access.    For providers and/or their staff who would like to refer a patient to [facility], please contact us through our one-stop-shop provider referral line, [facility] , at [phone].    If you feel you have received this communication in error or would no longer like to receive these types of communications, please e-mail [email or web address]

## 2018-02-21 NOTE — PROGRESS NOTES
Subjective:       Patient ID: Shannen Manning is a 57 y.o. female.    Chief Complaint: COPD    HPI COPD  She presents for evaluation and treatment of COPD. The patient is not currently have symptoms / an exacerbation. The patient has COPD for approximately 10 years. Symptoms in previous episodes have included dyspnea, cough and wheezing, and typically last 2 weeks. Previous episodes have been exacerbated by moderate activity. Current treatment includes albuterol inhaler, which generally provides some relief of symptoms.   She uses 1 pillows at night. Patient currently is not on home oxygen therapy.. The patient is having no constitutional symptoms, denying fever, chills, anorexia, or weight loss. The patient has not been hospitalized for this condition before. She quit smoking approximately 2 years ago. The patient is experiencing exercise intolerance (difficulty walking 2 blocks on flat ground).    Stopped smoking 2 years ago    Obstructive Sleep Apnea:  Patient is using CPAP as prescribed and benefiting from therapy.    Patient has complaints of Mask is uncomfortable          Past Medical History:   Diagnosis Date    COPD (chronic obstructive pulmonary disease)     Hypertension     IVON on CPAP      Past Surgical History:   Procedure Laterality Date    HYSTERECTOMY      NOSE SURGERY      TEMPOROMANDIBULAR JOINT SURGERY      tonsilectomy       Social History     Social History    Marital status:      Spouse name: N/A    Number of children: N/A    Years of education: N/A     Occupational History    Not on file.     Social History Main Topics    Smoking status: Former Smoker     Packs/day: 2.00     Types: Cigarettes    Smokeless tobacco: Not on file    Alcohol use No    Drug use: No    Sexual activity: Not on file     Other Topics Concern    Not on file     Social History Narrative    No narrative on file     Review of Systems   Constitutional: Positive for fatigue. Negative for fever.    HENT: Positive for postnasal drip, rhinorrhea and congestion.    Eyes: Negative for redness and itching.   Respiratory: Positive for cough, sputum production, shortness of breath, dyspnea on extertion, use of rescue inhaler and Paroxysmal Nocturnal Dyspnea.    Cardiovascular: Negative for chest pain, palpitations and leg swelling.   Genitourinary: Negative for difficulty urinating and hematuria.   Endocrine: Negative for cold intolerance and heat intolerance.    Skin: Negative for rash.   Gastrointestinal: Negative for nausea and abdominal pain.   Neurological: Negative for dizziness, syncope, weakness and light-headedness.   Hematological: Negative for adenopathy. Does not bruise/bleed easily.   Psychiatric/Behavioral: Negative for sleep disturbance. The patient is not nervous/anxious.        Objective:      Physical Exam   Constitutional: She is oriented to person, place, and time. She appears well-developed and well-nourished.   HENT:   Head: Normocephalic and atraumatic.   Mouth/Throat: Oropharyngeal exudate present.   Eyes: Conjunctivae are normal. Pupils are equal, round, and reactive to light.   Neck: Neck supple. No JVD present. No tracheal deviation present. No thyromegaly present.   Cardiovascular: Normal rate, regular rhythm and normal heart sounds.    Pulmonary/Chest: Effort normal. No respiratory distress. She has decreased breath sounds. She has wheezes in the right lower field and the left lower field. She has no rhonchi. She has no rales. She exhibits no tenderness.   Abdominal: Soft. Bowel sounds are normal.   Musculoskeletal: Normal range of motion. She exhibits no edema.   Lymphadenopathy:     She has no cervical adenopathy.   Neurological: She is alert and oriented to person, place, and time.   Skin: Skin is warm and dry.   Nursing note and vitals reviewed.    Personal Diagnostic Review  Chest X-Ray: I personally reviewed the films and findings are:, air trapping/emphysema  Pulmonary function  tests:  Moderate obstruction    Pulmonary Function Tests 2/21/2018   SpO2 97   Height 60.000   Weight 3440   BMI (Calculated) 42.1   Some recent data might be hidden         Assessment:       1. IVON on CPAP    2. Chronic obstructive pulmonary disease, unspecified COPD type    3. Rhinitis, unspecified chronicity, unspecified type        Outpatient Encounter Prescriptions as of 2/21/2018   Medication Sig Dispense Refill    acyclovir (ZOVIRAX) 400 MG tablet Take 400 mg by mouth 2 (two) times daily.      albuterol (PROVENTIL) 2.5 mg /3 mL (0.083 %) nebulizer solution Take 3 mLs (2.5 mg total) by nebulization every 6 (six) hours as needed for Shortness of Breath. 360 mL 11    amlodipine (NORVASC) 10 MG tablet Take 10 mg by mouth once daily.      aspirin (ECOTRIN) 81 MG EC tablet Take 81 mg by mouth once daily.      cetirizine (ZYRTEC) 10 MG tablet Take 10 mg by mouth.      clopidogrel (PLAVIX) 75 mg tablet Take 75 mg by mouth.      cyanocobalamin (VITAMIN B-12) 1000 MCG tablet Take 2,500 mcg by mouth.       dicyclomine (BENTYL) 10 MG capsule Take 10 mg by mouth.      estradiol (ESTRACE) 1 MG tablet Take 1 mg by mouth.      estradiol (VAGIFEM) 10 mcg Tab Place 10 mcg vaginally.      fluticasone (FLONASE) 50 mcg/actuation nasal spray 2 sprays (100 mcg total) by Each Nare route once daily. 3 Bottle 4    fluticasone-salmeterol 250-50 mcg/dose (ADVAIR DISKUS) 250-50 mcg/dose diskus inhaler Inhale 1 puff into the lungs once daily. 90 each 4    gabapentin (NEURONTIN) 600 MG tablet Take 600 mg by mouth 3 (three) times daily.      ibuprofen (ADVIL,MOTRIN) 800 MG tablet Take 800 mg by mouth 3 (three) times daily.      metformin (GLUCOPHAGE-XR) 500 MG 24 hr tablet       metoprolol succinate (TOPROL-XL) 100 MG 24 hr tablet Take 100 mg by mouth once daily.      montelukast (SINGULAIR) 10 mg tablet Take 1 tablet (10 mg total) by mouth every evening. 90 tablet 4    multivitamin (ONE DAILY MULTIVITAMIN) per tablet Take  1 tablet by mouth.      nystatin (MYCOSTATIN) powder Apply topically.      omeprazole (PRILOSEC) 40 MG capsule Take 40 mg by mouth once daily.      PNV,calcium 72-iron-folic acid 27 mg iron- 1 mg Tab Take by mouth.      tamsulosin (FLOMAX) 0.4 mg Cp24 Take 0.4 mg by mouth.      zinc sulfate (ZINCATE) 220 (50) mg capsule Take 220 mg by mouth once daily.      [DISCONTINUED] albuterol (PROAIR HFA) 90 mcg/actuation inhaler Inhale 2 puffs into the lungs every 4 (four) hours as needed for Wheezing. 3 Inhaler 3    [DISCONTINUED] cholecalciferol, vitamin D3, (VITAMIN D3) 2,000 unit Cap Take 1 capsule by mouth once daily.      [DISCONTINUED] fluticasone (FLONASE) 50 mcg/actuation nasal spray 1 puff each nostril once a day      [DISCONTINUED] fluticasone-salmeterol 250-50 mcg/dose (ADVAIR DISKUS) 250-50 mcg/dose diskus inhaler Inhale 1 puff into the lungs once daily. 90 each 3    [DISCONTINUED] montelukast (SINGULAIR) 10 mg tablet Take 1 tablet (10 mg total) by mouth every evening. 90 tablet 11    [DISCONTINUED] SPIRIVA WITH HANDIHALER 18 mcg inhalation capsule INHALE THE CONTENTS OF ONE CAPSULE DAILY (INTO THE    LUNGS) 90 capsule 3    albuterol 90 mcg/actuation inhaler Inhale 2 puffs into the lungs every 6 (six) hours. 3 Inhaler 4    umeclidinium 62.5 mcg/actuation DsDv Inhale 1 puff into the lungs once daily. 90 each 4    [DISCONTINUED] calcium citrate-vitamin D3 315-200 mg (CITRACAL+D) 315-200 mg-unit per tablet Take 1 tablet by mouth 2 (two) times daily.      [DISCONTINUED] docusate sodium (COLACE) 100 MG capsule Take 100 mg by mouth 2 (two) times daily.      [DISCONTINUED] pravastatin (PRAVACHOL) 20 MG tablet Take 20 mg by mouth once daily.      [DISCONTINUED] rosuvastatin (CRESTOR) 10 MG tablet Take 10 mg by mouth.      [DISCONTINUED] tizanidine (ZANAFLEX) 4 MG tablet        No facility-administered encounter medications on file as of 2/21/2018.      Orders Placed This Encounter   Procedures     CPAP/BIPAP SUPPLIES     [place] View Full Face CPAP Mask with Headgear     Order Specific Question:   Type of mask:     Answer:   FFM     Order Specific Question:   Headgear?     Answer:   Yes     Order Specific Question:   Tubing?     Answer:   Yes     Order Specific Question:   Humidifier chamber?     Answer:   Yes     Order Specific Question:   Chin strap?     Answer:   Yes     Order Specific Question:   Filters?     Answer:   Yes     Order Specific Question:   Other supplies:     Answer:   Give 90 day supply with 4 refills     Order Specific Question:   Length of need (1-99 months):     Answer:   99    X-Ray Chest PA And Lateral     Standing Status:   Future     Standing Expiration Date:   2019     Order Specific Question:   May the Radiologist modify the order per protocol to meet the clinical needs of the patient?     Answer:   Yes    Spirometry with/without bronchodilator     Standing Status:   Future     Standing Expiration Date:   2019     Plan:       Requested Prescriptions     Signed Prescriptions Disp Refills    umeclidinium 62.5 mcg/actuation DsDv 90 each 4     Sig: Inhale 1 puff into the lungs once daily.    albuterol 90 mcg/actuation inhaler 3 Inhaler 4     Sig: Inhale 2 puffs into the lungs every 6 (six) hours.    fluticasone-salmeterol 250-50 mcg/dose (ADVAIR DISKUS) 250-50 mcg/dose diskus inhaler 90 each 4     Sig: Inhale 1 puff into the lungs once daily.    montelukast (SINGULAIR) 10 mg tablet 90 tablet 4     Sig: Take 1 tablet (10 mg total) by mouth every evening.    fluticasone (FLONASE) 50 mcg/actuation nasal spray 3 Bottle 4     Si sprays (100 mcg total) by Each Nare route once daily.     IVON on CPAP  -     CPAP/BIPAP SUPPLIES    Chronic obstructive pulmonary disease, unspecified COPD type  -     umeclidinium 62.5 mcg/actuation DsDv; Inhale 1 puff into the lungs once daily.  Dispense: 90 each; Refill: 4  -     albuterol 90 mcg/actuation inhaler; Inhale 2 puffs into the lungs  every 6 (six) hours.  Dispense: 3 Inhaler; Refill: 4  -     fluticasone-salmeterol 250-50 mcg/dose (ADVAIR DISKUS) 250-50 mcg/dose diskus inhaler; Inhale 1 puff into the lungs once daily.  Dispense: 90 each; Refill: 4  -     montelukast (SINGULAIR) 10 mg tablet; Take 1 tablet (10 mg total) by mouth every evening.  Dispense: 90 tablet; Refill: 4  -     Spirometry with/without bronchodilator; Future; Expected date: 08/24/2018  -     X-Ray Chest PA And Lateral; Future; Expected date: 02/21/2018    Rhinitis, unspecified chronicity, unspecified type  -     fluticasone (FLONASE) 50 mcg/actuation nasal spray; 2 sprays (100 mcg total) by Each Nare route once daily.  Dispense: 3 Bottle; Refill: 4      Follow-up in about 1 year (around 2/21/2019) for cxr and sirisha.    MEDICAL DECISION MAKING: Moderate to high complexity.  Overall, the multiple problems listed are of moderate to high severity that may impact quality of life and activities of daily living. Side effects of medications, treatment plan as well as options and alternatives reviewed and discussed with patient. There was counseling of patient concerning these issues.    Total time spent in face to face counseling and coordination of care - 40 minutes over 50% of time was used in discussion of prognosis, risks, benefits of treatment, instructions and compliance with regimen . Discussion with other physicians or health care providers (homehealth, durable medical equipment providers).

## 2018-02-21 NOTE — PATIENT INSTRUCTIONS
[facility] Full Face CPAP Mask with Headgear  Umeclidinium inhalation powder  What is this medicine?  UMECLIDINIUM (ue MEK li DIN ee um) is a bronchodilator. It helps open up the airways of your lungs. It is for chronic obstructive pulmonary disease (COPD), including chronic bronchitis or emphysema. Do NOT use for asthma or an acute asthma attack. Do NOT use for a COPD attack.  How should I use this medicine?  This medicine is inhaled through the mouth. It is used once per day. Follow the directions on the prescription label. Do not use a spacer device with this inhaler. Take your medicine at regular intervals. Do not take your medicine more often than directed. Do not stop taking except on your doctor's advice. Make sure that you are using your inhaler correctly. Ask you doctor or health care provider if you have any questions.  Talk to your pediatrician regarding the use of this medicine in children. Special care may be needed.  What side effects may I notice from receiving this medicine?  Side effects that you should report to your doctor or health care professional as soon as possible:  · allergic reactions like skin rash or hives, swelling of the face, lips, or tongue  · breathing problems right after inhaling your medicine  · changes in vision  · cough  · fast or irregular heartbeat  · feeling faint or lightheaded, falls  · fever or chills  · sore throat  · trouble passing urine or change in the amount of urine  Side effects that usually do not require medical attention (Report these to your doctor or health care professional if they continue or are bothersome.):  · headache  · muscle pain  · nausea  · stomach pain  What may interact with this medicine?  · atropine  · antihistamines for allergy, cough and cold  · certain medicines for bladder problems like oxybutynin, tolterodine  · certain medicines for stomach problems like dicyclomine, hyoscyamine  · certain medicines for travel sickness like  scopolamine  · certain medicines for Parkinson's disease like benztropine, trihexyphenidyl  · ipratropium  · tiotropium  What if I miss a dose?  If you miss a dose, use it as soon as you can. If it is almost time for your next dose, use only that dose and continue with your regular schedule. Do not use double or extra doses.  Where should I keep my medicine?  Keep out of the reach of children.  Store at room temperature between 15 and 30 degrees C (59 and 86 degrees F). Store in a dry place away from direct heat or sunlight. Throw away 6 weeks after you remove the inhaler from the foil tray, or after the dose indicator reads 0, whichever comes first. Throw away any unopened packages after the expiration date.  What should I tell my health care provider before I take this medicine?  They need to know if you have any of these conditions:  · bladder problems or difficulty passing urine  · glaucoma  · prostate trouble  · an unusual or allergic reaction to umeclidinium, lactose, milk proteins, other medicines, foods, dyes, or preservatives  · pregnant or trying to get pregnant  · breast-feeding  What should I watch for while using this medicine?  Visit your doctor or health care professional for regular checkups. Tell your doctor or health care professional if your symptoms do not get better.  If your symptoms get worse or if you need your short-acting inhalers more often, call your doctor right away. Do not use this medicine more than once every 24 hours.  NOTE:This sheet is a summary. It may not cover all possible information. If you have questions about this medicine, talk to your doctor, pharmacist, or health care provider. Copyright© 2017 [employer]

## 2018-02-22 LAB
POST FEF 25 75: 1.24 L/S (ref 1.66–2.74)
POST FET 100: 12.06 S
POST FEV1 FVC: 73 %
POST FEV1: 1.67 L (ref 1.97–2.48)
POST FIF 50: 1.21 L/S
POST FVC: 2.3 L (ref 2.56–3.17)
POST PEF: 3.39 L/S (ref 5.03–6.53)
PRE DLCO: 13.11 ML/MMHG/MIN (ref 19.31–27.6)
PRE ERV: 0.28 L
PRE FEF 25 75: 1.26 L/S (ref 1.66–2.74)
PRE FET 100: 10.54 S
PRE FEV1 FVC: 74 %
PRE FEV1: 1.68 L (ref 1.97–2.48)
PRE FIF 50: 1.13 L/S
PRE FRC PL: 2.24 L (ref 1.23–2.18)
PRE FVC: 2.27 L (ref 2.56–3.17)
PRE KROGHS K: 3.92 1/MIN
PRE PEF: 4.61 L/S (ref 5.03–6.53)
PRE RV: 1.93 L (ref 1.15–1.85)
PRE SVC: 2.38 L
PRE TLC: 4.31 L (ref 3.72–4.49)
PREDICTED DLCO: 23.46 ML/MMHG/MIN (ref 19.31–27.6)
PREDICTED FEV1 FVC: 78.48 % (ref 73.59–83.38)
PREDICTED FEV1: 2.23 L (ref 1.97–2.48)
PREDICTED FRC N2: 1.7 L (ref 1.23–2.18)
PREDICTED FRC PL: 1.7 L (ref 1.23–2.18)
PREDICTED FVC: 2.87 L (ref 2.56–3.17)
PREDICTED RV: 1.5 L (ref 1.15–1.85)
PREDICTED SVC: 2.57 L
PREDICTED TLC: 4.11 L (ref 3.72–4.49)
PROVOCATION PROTOCOL: ABNORMAL

## 2019-01-28 ENCOUNTER — CLINICAL SUPPORT (OUTPATIENT)
Dept: PULMONOLOGY | Facility: CLINIC | Age: 59
End: 2019-01-28
Payer: MEDICARE

## 2019-01-28 ENCOUNTER — HOSPITAL ENCOUNTER (OUTPATIENT)
Dept: RADIOLOGY | Facility: HOSPITAL | Age: 59
Discharge: HOME | End: 2019-01-28
Attending: INTERNAL MEDICINE
Payer: MEDICARE

## 2019-01-28 ENCOUNTER — OFFICE VISIT (OUTPATIENT)
Dept: PULMONOLOGY | Facility: CLINIC | Age: 59
End: 2019-01-28
Payer: MEDICARE

## 2019-01-28 VITALS
SYSTOLIC BLOOD PRESSURE: 120 MMHG | HEIGHT: 60 IN | WEIGHT: 207 LBS | RESPIRATION RATE: 17 BRPM | BODY MASS INDEX: 40.64 KG/M2 | OXYGEN SATURATION: 96 % | DIASTOLIC BLOOD PRESSURE: 74 MMHG | HEART RATE: 96 BPM

## 2019-01-28 DIAGNOSIS — J31.0 OTHER RHINITIS: ICD-10-CM

## 2019-01-28 DIAGNOSIS — J44.9 CHRONIC OBSTRUCTIVE PULMONARY DISEASE, UNSPECIFIED COPD TYPE: ICD-10-CM

## 2019-01-28 DIAGNOSIS — G47.33 OSA ON CPAP: Primary | ICD-10-CM

## 2019-01-28 PROCEDURE — 99214 OFFICE O/P EST MOD 30 MIN: CPT | Mod: PBBFAC,25,PN | Performed by: NURSE PRACTITIONER

## 2019-01-28 PROCEDURE — 94060 PR EVAL OF BRONCHOSPASM: ICD-10-PCS | Mod: 26,S$PBB,, | Performed by: INTERNAL MEDICINE

## 2019-01-28 PROCEDURE — 99213 PR OFFICE/OUTPT VISIT, EST, LEVL III, 20-29 MIN: ICD-10-PCS | Mod: 25,S$PBB,, | Performed by: NURSE PRACTITIONER

## 2019-01-28 PROCEDURE — 71046 XR CHEST PA AND LATERAL: ICD-10-PCS | Mod: 26,,, | Performed by: RADIOLOGY

## 2019-01-28 PROCEDURE — 99213 OFFICE O/P EST LOW 20 MIN: CPT | Mod: 25,S$PBB,, | Performed by: NURSE PRACTITIONER

## 2019-01-28 PROCEDURE — 94060 EVALUATION OF WHEEZING: CPT | Mod: PBBFAC,PN

## 2019-01-28 PROCEDURE — 71046 X-RAY EXAM CHEST 2 VIEWS: CPT | Mod: 26,,, | Performed by: RADIOLOGY

## 2019-01-28 PROCEDURE — 99999 PR PBB SHADOW E&M-EST. PATIENT-LVL IV: ICD-10-PCS | Mod: PBBFAC,,, | Performed by: NURSE PRACTITIONER

## 2019-01-28 PROCEDURE — 94060 EVALUATION OF WHEEZING: CPT | Mod: 26,S$PBB,, | Performed by: INTERNAL MEDICINE

## 2019-01-28 PROCEDURE — 99999 PR PBB SHADOW E&M-EST. PATIENT-LVL IV: CPT | Mod: PBBFAC,,, | Performed by: NURSE PRACTITIONER

## 2019-01-28 PROCEDURE — 71046 X-RAY EXAM CHEST 2 VIEWS: CPT | Mod: TC

## 2019-01-28 RX ORDER — FLUTICASONE PROPIONATE 50 MCG
2 SPRAY, SUSPENSION (ML) NASAL DAILY
Qty: 3 BOTTLE | Refills: 4 | Status: SHIPPED | OUTPATIENT
Start: 2019-01-28

## 2019-01-28 RX ORDER — FLUTICASONE PROPIONATE AND SALMETEROL 250; 50 UG/1; UG/1
1 POWDER RESPIRATORY (INHALATION) DAILY
Qty: 90 EACH | Refills: 4 | Status: SHIPPED | OUTPATIENT
Start: 2019-01-28 | End: 2020-01-28

## 2019-01-28 RX ORDER — ALBUTEROL SULFATE 90 UG/1
2 AEROSOL, METERED RESPIRATORY (INHALATION) EVERY 6 HOURS
Qty: 3 INHALER | Refills: 4 | Status: SHIPPED | OUTPATIENT
Start: 2019-01-28 | End: 2020-01-28

## 2019-01-28 RX ORDER — MONTELUKAST SODIUM 10 MG/1
10 TABLET ORAL NIGHTLY
Qty: 90 TABLET | Refills: 4 | Status: SHIPPED | OUTPATIENT
Start: 2019-01-28

## 2019-01-28 NOTE — PROGRESS NOTES
Subjective:      Patient ID: Shannen Manning is a 58 y.o. female.    Chief Complaint: Sleep Apnea    HPI  Patient with COPD and IVON on CPAP.   Quit smoking 2.5yrs ago.   Pulmonary inhalers inclued Advair, incruse Albuerol.  No fever, chills, or hemoptysis. No pleuritic type chest pain. Breathing is stable as compared to 6 months ago.  Patient is not wearing CPAP. No set sleep schedule and difficulty falling asleep at times.   Frequent nighttime awakenings.      /74   Pulse 96   Resp 17   Ht 5' (1.524 m)   Wt 93.9 kg (207 lb)   SpO2 96%   BMI 40.43 kg/m²   Body mass index is 40.43 kg/m².    Review of Systems   Psychiatric/Behavioral: Positive for sleep disturbance.   All other systems reviewed and are negative.    Objective:      Physical Exam   Constitutional: She is oriented to person, place, and time. She appears well-developed and well-nourished.   Obese   HENT:   Head: Normocephalic and atraumatic.   Neck: Normal range of motion. Neck supple.   Cardiovascular: Normal rate and regular rhythm.   Pulmonary/Chest: Effort normal and breath sounds normal.   Musculoskeletal: Normal range of motion. She exhibits no edema.   Neurological: She is alert and oriented to person, place, and time.   Skin: Skin is warm and dry.   Psychiatric: She has a normal mood and affect.     Personal Diagnostic Review  Spirometry reviewed. FEV1 84 % of predicted.  FEV1/ FVC ratio 56%    CXR: stable    CPAP download shows  Patient has not used since March 2018      Assessment:       1. IVON on CPAP    2. Chronic obstructive pulmonary disease, unspecified COPD type    3. Rhinitis        Outpatient Encounter Medications as of 1/28/2019   Medication Sig Dispense Refill    acyclovir (ZOVIRAX) 400 MG tablet Take 400 mg by mouth 2 (two) times daily.      albuterol (PROVENTIL) 2.5 mg /3 mL (0.083 %) nebulizer solution Take 3 mLs (2.5 mg total) by nebulization every 6 (six) hours as needed for Shortness of Breath. 360 mL 11     amlodipine (NORVASC) 10 MG tablet Take 10 mg by mouth once daily.      aspirin (ECOTRIN) 81 MG EC tablet Take 81 mg by mouth once daily.      clopidogrel (PLAVIX) 75 mg tablet Take 75 mg by mouth.      cyanocobalamin (VITAMIN B-12) 1000 MCG tablet Take 2,500 mcg by mouth.       dicyclomine (BENTYL) 10 MG capsule Take 10 mg by mouth.      estradiol (ESTRACE) 1 MG tablet Take 1 mg by mouth.      estradiol (VAGIFEM) 10 mcg Tab Place 10 mcg vaginally.      fluticasone (FLONASE) 50 mcg/actuation nasal spray 2 sprays (100 mcg total) by Each Nare route once daily. 3 Bottle 4    gabapentin (NEURONTIN) 600 MG tablet Take 600 mg by mouth 3 (three) times daily.      ibuprofen (ADVIL,MOTRIN) 800 MG tablet Take 800 mg by mouth 3 (three) times daily.      metformin (GLUCOPHAGE-XR) 500 MG 24 hr tablet       metoprolol succinate (TOPROL-XL) 100 MG 24 hr tablet Take 100 mg by mouth once daily.      multivitamin (ONE DAILY MULTIVITAMIN) per tablet Take 1 tablet by mouth.      omeprazole (PRILOSEC) 40 MG capsule Take 40 mg by mouth once daily.      PNV,calcium 72-iron-folic acid 27 mg iron- 1 mg Tab Take by mouth.      tamsulosin (FLOMAX) 0.4 mg Cp24 Take 0.4 mg by mouth.      zinc sulfate (ZINCATE) 220 (50) mg capsule Take 220 mg by mouth once daily.      [DISCONTINUED] albuterol 90 mcg/actuation inhaler Inhale 2 puffs into the lungs every 6 (six) hours. 3 Inhaler 4    [DISCONTINUED] fluticasone (FLONASE) 50 mcg/actuation nasal spray 2 sprays (100 mcg total) by Each Nare route once daily. 3 Bottle 4    [DISCONTINUED] fluticasone-salmeterol 250-50 mcg/dose (ADVAIR DISKUS) 250-50 mcg/dose diskus inhaler Inhale 1 puff into the lungs once daily. 90 each 4    [DISCONTINUED] montelukast (SINGULAIR) 10 mg tablet Take 1 tablet (10 mg total) by mouth every evening. 90 tablet 4    albuterol (PROVENTIL/VENTOLIN HFA) 90 mcg/actuation inhaler Inhale 2 puffs into the lungs every 6 (six) hours. 3 Inhaler 4    cetirizine  (ZYRTEC) 10 MG tablet Take 10 mg by mouth.      fluticasone-salmeterol 250-50 mcg/dose (ADVAIR DISKUS) 250-50 mcg/dose diskus inhaler Inhale 1 puff into the lungs once daily. 90 each 4    montelukast (SINGULAIR) 10 mg tablet Take 1 tablet (10 mg total) by mouth every evening. 90 tablet 4    nystatin (MYCOSTATIN) powder Apply topically.      umeclidinium (INCRUSE ELLIPTA) 62.5 mcg/actuation DsDv Inhale 62.5 mcg into the lungs once daily. 90 each 3     No facility-administered encounter medications on file as of 1/28/2019.      Orders Placed This Encounter   Procedures    CPAP/BIPAP SUPPLIES     Order Specific Question:   Type of mask:     Answer:   FFM     Order Specific Question:   Headgear?     Answer:   Yes     Order Specific Question:   Tubing?     Answer:   Yes     Order Specific Question:   Humidifier chamber?     Answer:   Yes     Order Specific Question:   Chin strap?     Answer:   Yes     Order Specific Question:   Filters?     Answer:   Yes     Order Specific Question:   Length of need (1-99 months):     Answer:   99    X-Ray Chest PA And Lateral     Standing Status:   Future     Standing Expiration Date:   1/28/2020     Order Specific Question:   May the Radiologist modify the order per protocol to meet the clinical needs of the patient?     Answer:   Yes    Spirometry with/without bronchodilator     Standing Status:   Future     Standing Expiration Date:   1/28/2020     Plan:    discussed health risk of untreated sleep apnea.  Start back on CPAP tonight.  Melatonin if needed.  Continue current pulmonary regimen.   follow up 1 year with chest x-ray, CPAP download, spirometry.

## 2019-01-28 NOTE — LETTER
January 28, 2019      Derrick Marlow MD  [address]  [place] [address]           [facility]  [address]  [place] [address]  Phone: [phone]  Fax: [phone]          Patient: Shannen Manning   MR Number: [id number]   YOB: 1960   Date of Visit: 1/28/2019       Dear Dr. Derrick Marlow:    Thank you for referring Shannen Manning to me for evaluation. Attached you will find relevant portions of my assessment and plan of care.    If you have questions, please do not hesitate to call me. I look forward to following Shannen Manning along with you.    Sincerely,    Elizabeth Lejeune, GABY    Enclosure  CC:  No Recipients    If you would like to receive this communication electronically, please contact [email or web address] or [phone] to request more information on [employer] Link access.    For providers and/or their staff who would like to refer a patient to [facility], please contact us through our one-stop-shop provider referral line, [facility] , at [phone].    If you feel you have received this communication in error or would no longer like to receive these types of communications, please e-mail [email or web address]

## 2019-01-31 LAB
BRPFT: ABNORMAL
FEF 25 75 CHG: 19.5 %
FEF 25 75 LLN: 1.09
FEF 25 75 POST REF: 23 %
FEF 25 75 PRE REF: 19.3 %
FEF 25 75 REF: 2.11
FET100 CHG: 0.3 %
FEV1 CHG: 1.6 %
FEV1 FVC CHG: 2.7 %
FEV1 FVC LLN: 68
FEV1 FVC POST REF: 70.3 %
FEV1 FVC PRE REF: 68.5 %
FEV1 FVC REF: 80
FEV1 LLN: 1.67
FEV1 POST REF: 84 %
FEV1 PRE REF: 82.7 %
FEV1 REF: 2.2
FEV6 CHG: 0.8 %
FEV6 LLN: 2.16
FEV6 POST REF: 96.8 %
FEV6 POST: 2.67 L (ref 2.16–3.35)
FEV6 PRE REF: 96.1 %
FEV6 PRE: 2.65 L (ref 2.16–3.35)
FEV6 REF: 2.76
FVC CHG: -1.1 %
FVC LLN: 2.1
FVC POST REF: 119 %
FVC PRE REF: 120.3 %
FVC REF: 2.76
MVV LLN: 66
MVV REF: 78
PEF CHG: 3.8 %
PEF LLN: 4.25
PEF POST REF: 52.7 %
PEF PRE REF: 50.8 %
PEF REF: 5.75
POST FEF 25 75: 0.49 L/S (ref 1.09–3.47)
POST FET 100: 15.42 SEC
POST FEV1 FVC: 56.27 % (ref 67.99–90.22)
POST FEV1: 1.85 L (ref 1.67–2.71)
POST FVC: 3.28 L (ref 2.1–3.45)
POST PEF: 3.03 L/S (ref 4.25–7.26)
PRE FEF 25 75: 0.41 L/S (ref 1.09–3.47)
PRE FET 100: 15.37 SEC
PRE FEV1 FVC: 54.79 % (ref 67.99–90.22)
PRE FEV1: 1.82 L (ref 1.67–2.71)
PRE FVC: 3.32 L (ref 2.1–3.45)
PRE PEF: 2.92 L/S (ref 4.25–7.26)

## 2019-06-14 ENCOUNTER — HOSPITAL ENCOUNTER (EMERGENCY)
Facility: HOSPITAL | Age: 59
Discharge: HOME | End: 2019-06-14
Attending: EMERGENCY MEDICINE
Payer: MEDICARE

## 2019-06-14 VITALS
SYSTOLIC BLOOD PRESSURE: 131 MMHG | HEIGHT: 60 IN | HEART RATE: 89 BPM | WEIGHT: 214.31 LBS | BODY MASS INDEX: 42.07 KG/M2 | OXYGEN SATURATION: 97 % | RESPIRATION RATE: 20 BRPM | DIASTOLIC BLOOD PRESSURE: 66 MMHG | TEMPERATURE: 99 F

## 2019-06-14 DIAGNOSIS — M77.8 THUMB TENDONITIS: Primary | ICD-10-CM

## 2019-06-14 PROCEDURE — 99283 EMERGENCY DEPT VISIT LOW MDM: CPT | Mod: ER

## 2019-06-14 RX ORDER — NAPROXEN 500 MG/1
500 TABLET ORAL 2 TIMES DAILY WITH MEALS
Qty: 10 TABLET | Refills: 0 | Status: SHIPPED | OUTPATIENT
Start: 2019-06-14

## 2019-06-14 NOTE — ED PROVIDER NOTES
Encounter Date: 6/14/2019       History     Chief Complaint   Patient presents with    Hand Pain     c/o right thumb pain denies injury     The history is provided by the patient.   Hand Injury    Illness onset: right thumb (ain after lifting groceries and working yeard today, no injury  Incident location: at [facility]. There was no injury mechanism. The pain is present in the right hand (thumb ). The quality of the pain is described as aching. The pain is at a severity of 4/10. The pain has been intermittent (worse with movement ) since the incident. Pertinent negatives include no fever. She reports no foreign bodies present. The symptoms are aggravated by movement, palpation and use. She has tried nothing for the symptoms.     Review of patient's allergies indicates:   Allergen Reactions    Demerol [meperidine] Nausea And Vomiting     Past Medical History:   Diagnosis Date    COPD (chronic obstructive pulmonary disease)     Hypertension     IVON on CPAP      Past Surgical History:   Procedure Laterality Date    HYSTERECTOMY      NOSE SURGERY      TEMPOROMANDIBULAR JOINT SURGERY      tonsilectomy       Family History   Problem Relation Age of Onset    Asthma Father     Emphysema Father     Diabetes Mother     Hypertension Mother     Cancer Maternal Grandmother     Cancer Maternal Grandfather      Social History     Tobacco Use    Smoking status: Former Smoker     Packs/day: 2.00     Types: Cigarettes    Smokeless tobacco: Never Used   Substance Use Topics    Alcohol use: No    Drug use: No     Review of Systems   Constitutional: Negative for fever.   HENT: Negative for sore throat.    Respiratory: Negative for shortness of breath.    Cardiovascular: Negative for chest pain.   Gastrointestinal: Negative for nausea.   Genitourinary: Negative for dysuria.   Musculoskeletal: Negative for back pain.        Right thumb pain    Skin: Negative for rash.   Neurological: Negative for weakness.   Hematological:  Does not bruise/bleed easily.   All other systems reviewed and are negative.      Physical Exam     Initial Vitals [06/14/19 1832]   BP Pulse Resp Temp SpO2   136/64 93 20 98.6 °F (37 °C) 96 %      MAP       --         Physical Exam    Nursing note and vitals reviewed.  Constitutional: She appears well-developed and well-nourished.   HENT:   Head: Normocephalic and atraumatic.   Mouth/Throat: No oropharyngeal exudate.   Eyes: Conjunctivae, EOM and lids are normal. Pupils are equal, round, and reactive to light. Lids are everted and swept, no foreign bodies found.   Neck: Trachea normal, normal range of motion, full passive range of motion without pain and phonation normal. Neck supple.   Cardiovascular: Normal rate, regular rhythm, S1 normal, S2 normal, normal heart sounds, intact distal pulses and normal pulses.   Pulmonary/Chest: Effort normal and breath sounds normal. No respiratory distress.   Abdominal: Soft. Bowel sounds are normal.   Musculoskeletal:        Right hand: She exhibits tenderness.        Hands:  TTP to right mcp joint, no deformity, no crepitus   Lymphadenopathy:     She has no cervical adenopathy.     She has no axillary adenopathy.   Neurological: She is alert and oriented to person, place, and time. She has normal strength and normal reflexes. No cranial nerve deficit or sensory deficit. She displays a negative Romberg sign.   Skin: Skin is warm and dry. Capillary refill takes less than 2 seconds.         ED Course   Procedures  Labs Reviewed - No data to display       Imaging Results    None         Tendonitis  A tendon is the thick fibrous cord that joins muscle to bone and allows joints to move. When a tendon becomes inflamed, it is called tendonitis. This can occur from overuse, injury, or infection. This usually involves the shoulders, forearm, wrist, hands and foot. Symptoms include pain, swelling and tenderness to the touch. Moving the joint increases the pain.  It takes 4 to 6 weeks  for tendonitis to heal. It is treated by preventing motion of the tendon with a splint or brace and the use of anti-inflammatory medicine.  Home care  · Some people find relief with ice packs. These can be crushed or cubed ice in a plastic bag or a bag of frozen vegetables wrapped in a thin towel. Other people get better relief with heat. This can include a hot shower, hot bath, or a moist towel warmed in a microwave. Try each and use the method that feels best, for 15 to 20 minutes several times a day.  · Rest the inflamed joint and protect it from movement.  · You may use over-the-counter ibuprofen or naproxen to treat pain and inflammation, unless another medicine was prescribed. If you can't take these medicines, acetaminophen may help with the pain, but does not treat inflammation. If you have chronic liver or kidney disease or ever had a stomach ulcer or gastrointestinal bleeding, talk with your doctor before using these medicines.  · As your symptoms improve, begin gradual motion at the involved joint.  Follow-up care  Follow up with your healthcare provider if you are not improving after 5 days of treatment.  When to seek medical advice  Call your healthcare provider right away if any of these occur:  · Redness over the painful area  · Increasing pain or swelling at the joint  · Fever (1 degree above your normal temperature) lasting 24 to 48 hours Or, whatever your healthcare provider told you to report based on your condition     All historical, clinical, radiographic, and laboratory findings were reviewed with the patient in detail.  Findings are consistent with a diagnosis of right thumb tendonitis.  All remaining questions and concerns were addressed at that time.  Patient has been counseled regarding the need for follow-up as well as the indication to return to the emergency room should new or worrisome developments occur.                         Clinical Impression:       ICD-10-CM ICD-9-CM   1. Thumb  tendonitis M77.8 727.05                                Aroldo Mattson NP  06/14/19 1908       Aroldo Mattson NP  06/14/19 1924

## 2019-06-14 NOTE — ED NOTES
LOC: The patient is awake, alert and aware of environment with an appropriate affect, the patient is oriented x 3 and speaking appropriately.  APPEARANCE: Patient resting comfortably and in no acute distress, patient is clean and well groomed, patient's clothing is properly fastened.  HEENT: Brief WNL  SKIN: Brief WNL.   MUSCULOSKELETAL: Brief WNL. Right thumb pain pt denies injury able to move thumb hand without difficulty  RESPIRATORY: Brief WNL  CARDIAC: Brief WNL  GASTRO: Brief WNL  : Brief WNL  Peripheral Vasc: Brief WNL  NEURO: Brief WNL  PSYCH: Brief WNL